# Patient Record
Sex: FEMALE | Race: WHITE | NOT HISPANIC OR LATINO
[De-identification: names, ages, dates, MRNs, and addresses within clinical notes are randomized per-mention and may not be internally consistent; named-entity substitution may affect disease eponyms.]

---

## 2023-01-24 PROBLEM — Z00.00 ENCOUNTER FOR PREVENTIVE HEALTH EXAMINATION: Status: ACTIVE | Noted: 2023-01-24

## 2023-01-31 ENCOUNTER — TRANSCRIPTION ENCOUNTER (OUTPATIENT)
Age: 33
End: 2023-01-31

## 2023-01-31 ENCOUNTER — APPOINTMENT (OUTPATIENT)
Dept: ANTEPARTUM | Facility: CLINIC | Age: 33
End: 2023-01-31
Payer: COMMERCIAL

## 2023-01-31 ENCOUNTER — ASOB RESULT (OUTPATIENT)
Age: 33
End: 2023-01-31

## 2023-01-31 PROCEDURE — 76811 OB US DETAILED SNGL FETUS: CPT

## 2023-05-05 ENCOUNTER — INPATIENT (INPATIENT)
Facility: HOSPITAL | Age: 33
LOS: 3 days | Discharge: HOME | End: 2023-05-09
Attending: OBSTETRICS & GYNECOLOGY | Admitting: OBSTETRICS & GYNECOLOGY
Payer: COMMERCIAL

## 2023-05-05 VITALS
RESPIRATION RATE: 16 BRPM | OXYGEN SATURATION: 100 % | HEART RATE: 98 BPM | DIASTOLIC BLOOD PRESSURE: 72 MMHG | SYSTOLIC BLOOD PRESSURE: 129 MMHG | TEMPERATURE: 98 F

## 2023-05-05 DIAGNOSIS — O26.899 OTHER SPECIFIED PREGNANCY RELATED CONDITIONS, UNSPECIFIED TRIMESTER: ICD-10-CM

## 2023-05-05 DIAGNOSIS — Z34.80 ENCOUNTER FOR SUPERVISION OF OTHER NORMAL PREGNANCY, UNSPECIFIED TRIMESTER: ICD-10-CM

## 2023-05-05 LAB
APPEARANCE UR: ABNORMAL
APPEARANCE UR: CLEAR — SIGNIFICANT CHANGE UP
BACTERIA # UR AUTO: ABNORMAL
BACTERIA # UR AUTO: ABNORMAL
BILIRUB UR-MCNC: NEGATIVE — SIGNIFICANT CHANGE UP
BILIRUB UR-MCNC: NEGATIVE — SIGNIFICANT CHANGE UP
BLD GP AB SCN SERPL QL: NEGATIVE — SIGNIFICANT CHANGE UP
COLOR SPEC: SIGNIFICANT CHANGE UP
COLOR SPEC: SIGNIFICANT CHANGE UP
DIFF PNL FLD: ABNORMAL
DIFF PNL FLD: ABNORMAL
EPI CELLS # UR: 2 — SIGNIFICANT CHANGE UP
EPI CELLS # UR: 3 /HPF — SIGNIFICANT CHANGE UP
GLUCOSE UR QL: NEGATIVE — SIGNIFICANT CHANGE UP
GLUCOSE UR QL: NEGATIVE — SIGNIFICANT CHANGE UP
HCT VFR BLD CALC: 31.5 % — LOW (ref 34.5–45)
HGB BLD-MCNC: 10.2 G/DL — LOW (ref 11.5–15.5)
HYALINE CASTS # UR AUTO: 2 /LPF — SIGNIFICANT CHANGE UP (ref 0–2)
HYALINE CASTS # UR AUTO: 2 /LPF — SIGNIFICANT CHANGE UP (ref 0–7)
KETONES UR-MCNC: ABNORMAL
KETONES UR-MCNC: NEGATIVE — SIGNIFICANT CHANGE UP
LEUKOCYTE ESTERASE UR-ACNC: ABNORMAL
LEUKOCYTE ESTERASE UR-ACNC: ABNORMAL
MCHC RBC-ENTMCNC: 27.2 PG — SIGNIFICANT CHANGE UP (ref 27–34)
MCHC RBC-ENTMCNC: 32.4 GM/DL — SIGNIFICANT CHANGE UP (ref 32–36)
MCV RBC AUTO: 84 FL — SIGNIFICANT CHANGE UP (ref 80–100)
NITRITE UR-MCNC: NEGATIVE — SIGNIFICANT CHANGE UP
NITRITE UR-MCNC: NEGATIVE — SIGNIFICANT CHANGE UP
NRBC # BLD: 0 /100 WBCS — SIGNIFICANT CHANGE UP (ref 0–0)
PH UR: 6.5 — SIGNIFICANT CHANGE UP (ref 5–8)
PH UR: 7 — SIGNIFICANT CHANGE UP (ref 5–8)
PLATELET # BLD AUTO: 220 K/UL — SIGNIFICANT CHANGE UP (ref 150–400)
PROT UR-MCNC: NEGATIVE — SIGNIFICANT CHANGE UP
PROT UR-MCNC: SIGNIFICANT CHANGE UP
RBC # BLD: 3.75 M/UL — LOW (ref 3.8–5.2)
RBC # FLD: 12.8 % — SIGNIFICANT CHANGE UP (ref 10.3–14.5)
RBC CASTS # UR COMP ASSIST: 1 /HPF — SIGNIFICANT CHANGE UP (ref 0–4)
RBC CASTS # UR COMP ASSIST: 6 /HPF — HIGH (ref 0–4)
RH IG SCN BLD-IMP: POSITIVE — SIGNIFICANT CHANGE UP
RH IG SCN BLD-IMP: POSITIVE — SIGNIFICANT CHANGE UP
SP GR SPEC: 1.01 — SIGNIFICANT CHANGE UP (ref 1.01–1.02)
SP GR SPEC: 1.02 — SIGNIFICANT CHANGE UP (ref 1.01–1.02)
UROBILINOGEN FLD QL: NEGATIVE — SIGNIFICANT CHANGE UP
UROBILINOGEN FLD QL: NEGATIVE — SIGNIFICANT CHANGE UP
WBC # BLD: 12.81 K/UL — HIGH (ref 3.8–10.5)
WBC # FLD AUTO: 12.81 K/UL — HIGH (ref 3.8–10.5)
WBC UR QL: 12 /HPF — HIGH (ref 0–5)
WBC UR QL: 7 /HPF — HIGH (ref 0–5)

## 2023-05-05 RX ORDER — AMPICILLIN TRIHYDRATE 250 MG
1 CAPSULE ORAL EVERY 4 HOURS
Refills: 0 | Status: DISCONTINUED | OUTPATIENT
Start: 2023-05-05 | End: 2023-05-07

## 2023-05-05 RX ORDER — AMPICILLIN TRIHYDRATE 250 MG
2 CAPSULE ORAL ONCE
Refills: 0 | Status: COMPLETED | OUTPATIENT
Start: 2023-05-05 | End: 2023-05-05

## 2023-05-05 RX ORDER — ACETAMINOPHEN 500 MG
975 TABLET ORAL ONCE
Refills: 0 | Status: DISCONTINUED | OUTPATIENT
Start: 2023-05-05 | End: 2023-05-09

## 2023-05-05 RX ORDER — ACETAMINOPHEN 500 MG
1000 TABLET ORAL ONCE
Refills: 0 | Status: COMPLETED | OUTPATIENT
Start: 2023-05-05 | End: 2023-05-05

## 2023-05-05 RX ORDER — SODIUM CHLORIDE 9 MG/ML
1000 INJECTION, SOLUTION INTRAVENOUS
Refills: 0 | Status: DISCONTINUED | OUTPATIENT
Start: 2023-05-05 | End: 2023-05-09

## 2023-05-05 RX ORDER — SODIUM CHLORIDE 9 MG/ML
500 INJECTION, SOLUTION INTRAVENOUS ONCE
Refills: 0 | Status: COMPLETED | OUTPATIENT
Start: 2023-05-05 | End: 2023-05-05

## 2023-05-05 RX ADMIN — Medication 400 MILLIGRAM(S): at 21:52

## 2023-05-05 RX ADMIN — Medication 12 MILLIGRAM(S): at 13:28

## 2023-05-05 RX ADMIN — Medication 1000 MILLIGRAM(S): at 22:53

## 2023-05-05 RX ADMIN — SODIUM CHLORIDE 1000 MILLILITER(S): 9 INJECTION, SOLUTION INTRAVENOUS at 12:47

## 2023-05-05 RX ADMIN — Medication 200 GRAM(S): at 16:43

## 2023-05-05 RX ADMIN — SODIUM CHLORIDE 125 MILLILITER(S): 9 INJECTION, SOLUTION INTRAVENOUS at 19:00

## 2023-05-05 RX ADMIN — Medication 108 GRAM(S): at 20:40

## 2023-05-05 NOTE — OB PROVIDER TRIAGE NOTE - NSHPPHYSICALEXAM_GEN_ALL_CORE
T(C): 36.6 (05-05-23 @ 11:49), Max: 36.6 (05-05-23 @ 11:44)  HR: 83 (05-05-23 @ 12:14) (83 - 100)  BP: 129/72 (05-05-23 @ 11:49) (129/72 - 129/72)  RR: 16 (05-05-23 @ 11:49) (16 - 16)  SpO2: 96% (05-05-23 @ 12:14) (96% - 100%)  GEN:NAD  CV:RRR  Pulm: CTA bl  Abd soft NT gravid  FHT:   120  , mod molina, + accels, - decels   TOCO:  q2-5m  SSE no bleeding, +yellow discharge  VE: 1.5/50/-3/soft  SONO vtx

## 2023-05-05 NOTE — OB RN PATIENT PROFILE - FALL HARM RISK - UNIVERSAL INTERVENTIONS
Bed in lowest position, wheels locked, appropriate side rails in place/Call bell, personal items and telephone in reach/Instruct patient to call for assistance before getting out of bed or chair/Non-slip footwear when patient is out of bed/[place] to call system/Physically safe environment - no spills, clutter or unnecessary equipment/Purposeful Proactive Rounding/Room/bathroom lighting operational, light cord in reach

## 2023-05-05 NOTE — OB PROVIDER H&P - ATTENDING COMMENTS
Pt seen at bedside. Reviewed exam with pt and plan. Will consult MFM in AM for scan given one spontaneous deceleration earlier in the evening. Suspect bleeding due to cervical change however need to r/o abruption as well.     Lisa Braxton, DO

## 2023-05-05 NOTE — OB RN TRIAGE NOTE - FALL HARM RISK - UNIVERSAL INTERVENTIONS
Bed in lowest position, wheels locked, appropriate side rails in place/Call bell, personal items and telephone in reach/Instruct patient to call for assistance before getting out of bed or chair/Non-slip footwear when patient is out of bed/[place] to call system/Physically safe environment - no spills, clutter or unnecessary equipment/Purposeful Proactive Rounding/Room/bathroom lighting operational, light cord in reach

## 2023-05-05 NOTE — OB PROVIDER TRIAGE NOTE - NSOBPROVIDERNOTE_OBGYN_ALL_OB_FT
AP 34yo  @ 34w3d with brown discharge, r/o PTL, reassuring fetal heart tracing  -efm/toco  -ivf  -cbc, ua, t&s  -consider BMZ and repeat VE  DW Dr Venegas  Will EFRA Lofton PAC

## 2023-05-05 NOTE — OB PROVIDER H&P - ASSESSMENT
32yo  EDC 23 @ 34w3d admitted for r/o PTL. Pt c/o brown discharge and was found to be gaudencio on toco and made cervical change from 1.5/50/-3 to 2/60/-3. FHT reassuring.    #PTL  -continuous efm/toco  -GBS swab sent  -repeat UA  -f/u urine cx  -BMZ for FLM -    #Fetal Well Being  -BMZ -   -Continuous monitoring    #Maternal well-being  -CLD  -Continue to monitor VS  -Continue PNV and home Buspirone  -SCDs for DVT ppx       DW Dr Fox Lofton PAC

## 2023-05-05 NOTE — OB PROVIDER TRIAGE NOTE - HISTORY OF PRESENT ILLNESS
34yo  EDC 23 @ 34w3d reports brown, mucusy discharge this morning on toilet paper. Denies bright red vaginal bleeding. Pt reports lower abdominal pressure and evan-rodriges. Denies LOF. +FM. Denies recent intercourse or anything in vagina.  PNC uncomplicated      OB:   GYN:remote h/o abnlpap, neg colpo; Denies fibroids/ovarian cysts/STI's  PMH: Hypercholesterolemia, Miagraines, Anxiety  PSH: none  MEDS: PNV, Buspar 10mg BID  ALL:NKDA; seasonal  Psych: +anxiety  Accepts blood.

## 2023-05-05 NOTE — OB PROVIDER H&P - NSHPPHYSICALEXAM_GEN_ALL_CORE
T(C): 36.6 (05-05-23 @ 11:49), Max: 36.6 (05-05-23 @ 11:44)  HR: 85 (05-05-23 @ 14:59) (74 - 100)  BP: 129/72 (05-05-23 @ 11:49) (129/72 - 129/72)  RR: 16 (05-05-23 @ 11:49) (16 - 16)  SpO2: 96% (05-05-23 @ 14:59) (96% - 100%)  GEN:NAD  CV:RRR  Pulm: CTA bl  Abd soft NT gravid  FHT:    120 , mod molina, + accels, - decels   TOCO:  q2-5m  SSE no bleeding, scant white discharge  VE: 1.5/50/-3-->2/60/-3/soft  SONO vtx

## 2023-05-05 NOTE — OB PROVIDER H&P - HISTORY OF PRESENT ILLNESS
34yo  EDC 23 @ 34w3d reports brown, mucusy discharge this morning on toilet paper. Denies bright red vaginal bleeding. Pt reports lower abdominal pressure and evan-rodriges. Denies LOF. +FM. Denies recent intercourse or anything in vagina.  PNC uncomplicated      OB:   GYN:remote h/o abnl pap, neg colpo; Denies fibroids/ovarian cysts/STI's  PMH: Hypercholesterolemia, Miagraines, Anxiety  PSH: none  MEDS: PNV, Buspar 10mg BID  ALL:NKDA; seasonal  Psych: +anxiety  Accepts blood.  32yo  EDC 23 @ 34w3d reports brown, mucusy discharge this morning on toilet paper. Denies bright red vaginal bleeding. Pt reports lower abdominal pressure and evan-rodriges. Denies LOF. +FM. Denies recent intercourse or anything in vagina.  PNC uncomplicated      OB:   GYN:remote h/o abnl pap, neg colpo; Denies fibroids/ovarian cysts/STI's  PMH: Hypercholesterolemia, Migraines, Anxiety  PSH: none  MEDS: PNV, Buspar 10mg BID  ALL:NKDA; seasonal  Psych: +anxiety  Accepts blood.

## 2023-05-06 LAB
APTT BLD: 23.2 SEC — LOW (ref 27.5–35.5)
BASOPHILS # BLD AUTO: 0.02 K/UL — SIGNIFICANT CHANGE UP (ref 0–0.2)
BASOPHILS NFR BLD AUTO: 0.1 % — SIGNIFICANT CHANGE UP (ref 0–2)
COVID-19 SPIKE DOMAIN AB INTERP: POSITIVE
COVID-19 SPIKE DOMAIN ANTIBODY RESULT: >250 U/ML — HIGH
CULTURE RESULTS: SIGNIFICANT CHANGE UP
EOSINOPHIL # BLD AUTO: 0 K/UL — SIGNIFICANT CHANGE UP (ref 0–0.5)
EOSINOPHIL NFR BLD AUTO: 0 % — SIGNIFICANT CHANGE UP (ref 0–6)
FIBRINOGEN PPP-MCNC: 485 MG/DL — HIGH (ref 200–445)
HCT VFR BLD CALC: 30.8 % — LOW (ref 34.5–45)
HGB BLD-MCNC: 9.9 G/DL — LOW (ref 11.5–15.5)
IMM GRANULOCYTES NFR BLD AUTO: 1.3 % — HIGH (ref 0–0.9)
INR BLD: 0.98 RATIO — SIGNIFICANT CHANGE UP (ref 0.88–1.16)
LYMPHOCYTES # BLD AUTO: 1.51 K/UL — SIGNIFICANT CHANGE UP (ref 1–3.3)
LYMPHOCYTES # BLD AUTO: 9.4 % — LOW (ref 13–44)
MCHC RBC-ENTMCNC: 27.1 PG — SIGNIFICANT CHANGE UP (ref 27–34)
MCHC RBC-ENTMCNC: 32.1 GM/DL — SIGNIFICANT CHANGE UP (ref 32–36)
MCV RBC AUTO: 84.4 FL — SIGNIFICANT CHANGE UP (ref 80–100)
MONOCYTES # BLD AUTO: 0.43 K/UL — SIGNIFICANT CHANGE UP (ref 0–0.9)
MONOCYTES NFR BLD AUTO: 2.7 % — SIGNIFICANT CHANGE UP (ref 2–14)
NEUTROPHILS # BLD AUTO: 13.95 K/UL — HIGH (ref 1.8–7.4)
NEUTROPHILS NFR BLD AUTO: 86.5 % — HIGH (ref 43–77)
NRBC # BLD: 0 /100 WBCS — SIGNIFICANT CHANGE UP (ref 0–0)
PLATELET # BLD AUTO: 263 K/UL — SIGNIFICANT CHANGE UP (ref 150–400)
PROTHROM AB SERPL-ACNC: 11.3 SEC — SIGNIFICANT CHANGE UP (ref 10.5–13.4)
RBC # BLD: 3.65 M/UL — LOW (ref 3.8–5.2)
RBC # FLD: 13.2 % — SIGNIFICANT CHANGE UP (ref 10.3–14.5)
SARS-COV-2 IGG+IGM SERPL QL IA: >250 U/ML — HIGH
SARS-COV-2 IGG+IGM SERPL QL IA: POSITIVE
SPECIMEN SOURCE: SIGNIFICANT CHANGE UP
T PALLIDUM AB TITR SER: NEGATIVE — SIGNIFICANT CHANGE UP
WBC # BLD: 16.12 K/UL — HIGH (ref 3.8–10.5)
WBC # FLD AUTO: 16.12 K/UL — HIGH (ref 3.8–10.5)

## 2023-05-06 RX ORDER — ONDANSETRON 8 MG/1
4 TABLET, FILM COATED ORAL ONCE
Refills: 0 | Status: COMPLETED | OUTPATIENT
Start: 2023-05-06 | End: 2023-05-06

## 2023-05-06 RX ORDER — DIPHENHYDRAMINE HCL 50 MG
25 CAPSULE ORAL ONCE
Refills: 0 | Status: COMPLETED | OUTPATIENT
Start: 2023-05-06 | End: 2023-05-06

## 2023-05-06 RX ORDER — ACETAMINOPHEN 500 MG
1000 TABLET ORAL ONCE
Refills: 0 | Status: COMPLETED | OUTPATIENT
Start: 2023-05-06 | End: 2023-05-06

## 2023-05-06 RX ORDER — MORPHINE SULFATE 50 MG/1
4 CAPSULE, EXTENDED RELEASE ORAL ONCE
Refills: 0 | Status: DISCONTINUED | OUTPATIENT
Start: 2023-05-06 | End: 2023-05-06

## 2023-05-06 RX ORDER — OXYTOCIN 10 UNIT/ML
VIAL (ML) INJECTION
Qty: 30 | Refills: 0 | Status: DISCONTINUED | OUTPATIENT
Start: 2023-05-06 | End: 2023-05-07

## 2023-05-06 RX ADMIN — Medication 108 GRAM(S): at 00:58

## 2023-05-06 RX ADMIN — Medication 108 GRAM(S): at 21:23

## 2023-05-06 RX ADMIN — Medication 2 MILLIUNIT(S)/MIN: at 23:48

## 2023-05-06 RX ADMIN — Medication 108 GRAM(S): at 17:06

## 2023-05-06 RX ADMIN — ONDANSETRON 4 MILLIGRAM(S): 8 TABLET, FILM COATED ORAL at 06:29

## 2023-05-06 RX ADMIN — Medication 400 MILLIGRAM(S): at 06:03

## 2023-05-06 RX ADMIN — Medication 108 GRAM(S): at 13:25

## 2023-05-06 RX ADMIN — ONDANSETRON 4 MILLIGRAM(S): 8 TABLET, FILM COATED ORAL at 18:58

## 2023-05-06 RX ADMIN — Medication 25 MILLIGRAM(S): at 10:10

## 2023-05-06 RX ADMIN — Medication 108 GRAM(S): at 05:22

## 2023-05-06 RX ADMIN — Medication 108 GRAM(S): at 09:20

## 2023-05-06 RX ADMIN — Medication 1000 MILLIGRAM(S): at 07:00

## 2023-05-06 RX ADMIN — MORPHINE SULFATE 4 MILLIGRAM(S): 50 CAPSULE, EXTENDED RELEASE ORAL at 21:02

## 2023-05-06 RX ADMIN — MORPHINE SULFATE 4 MILLIGRAM(S): 50 CAPSULE, EXTENDED RELEASE ORAL at 20:01

## 2023-05-06 RX ADMIN — MORPHINE SULFATE 4 MILLIGRAM(S): 50 CAPSULE, EXTENDED RELEASE ORAL at 20:31

## 2023-05-06 RX ADMIN — Medication 12 MILLIGRAM(S): at 13:26

## 2023-05-06 RX ADMIN — MORPHINE SULFATE 4 MILLIGRAM(S): 50 CAPSULE, EXTENDED RELEASE ORAL at 21:17

## 2023-05-06 NOTE — CONSULT NOTE PEDS - SUBJECTIVE AND OBJECTIVE BOX
Ms. Zapien is a 34 y/o  admitted for PTL versus abruption, currently at 34w4d gestational age. Maternal history notable for hypercholesterolemia, migraines, anxiety (on buspar), and prenatal history notable for none aside from previously listed. s/p betamethasone -. Most recent EFW 2100 g per chart review.    I met with Ms. Rowe and her partner and discussed what to expect should she deliver at 34 weeks gestation. We discussed the followin. The NICU team will be present at her delivery and will immediately assess and care for her infant.    2. The infant may require respiratory support, most commonly in the form of nasal CPAP. While unlikely, there is a small possibility that the infant would require intubation and mechanical ventilation.    3. Depending on the clinical status of the infant, enteral feedings may or may not be started immediately. The infant will receive IVF/IV nutrition as necessary. Due to immature suck/swallow, orogastric or nasogastric tube may be required once feeds are initiated. The infant is also at risk for hypoglycemia due to prematurity.    4. Discussed the benefits of breastfeeding in  infants and encouraged mother to pump following delivery. Discussed donor breastmilk, as well. Donor breastmilk offered and declined by Ms. Zapien.     5. The infant will be at risk for jaundice which can be treated with phototherapy.    6. The infant will be screened for infection and treated with antibiotics if deemed clinically necessary.    7. The infant is at risk for thermoregulation issues.    8. The infant is at risk for developmental delays as a consequence of prematurity. The infant will be evaluated by a developmental pediatrician to monitor for neurodevelopmental delays.    9. Length of stay is highly variable. Reviewed average length of stay and discharge criteria.    Ms. Zapien and her partner had the opportunity to ask questions and may contact the NICU at any time if further questions arise.    Thank you for the opportunity to participate in the care of this patient and please inform us of any changes in her status.    Neil Rollins, PGY-4  NICU Fellow

## 2023-05-06 NOTE — PROGRESS NOTE ADULT - SUBJECTIVE AND OBJECTIVE BOX
R3 Antepartum Note, HD#2    Patient seen and examined at bedside. Reports irregular "stomach tightening Pt reports +FM, denies LOF, VB, HA, epigastric pain, blurred vision, CP, SOB, N/V, fevers, and chills.    Vital Signs Last 24 Hours  T(C): 36.8 (05-05-23 @ 22:06), Max: 36.8 (05-05-23 @ 15:42)  HR: 78 (05-06-23 @ 03:15) (72 - 118)  BP: 116/58 (05-05-23 @ 21:54) (116/58 - 133/73)  RR: 17 (05-05-23 @ 15:42) (16 - 17)  SpO2: 96% (05-06-23 @ 03:15) (94% - 100%)    CAPILLARY BLOOD GLUCOSE          Physical Exam:  General: NAD  Abdomen: Soft, non-tender, gravid  Ext: No pain or swelling    Cat 1 tracing overnight    Labs:             10.2   12.81 )-----------( 220      ( 05-05 @ 13:17 )             31.5                   MEDICATIONS  (STANDING):  acetaminophen     Tablet .. 975 milliGRAM(s) Oral once  ampicillin  IVPB 1 Gram(s) IV Intermittent every 4 hours  betamethasone Injectable 12 milliGRAM(s) IntraMuscular every 24 hours  busPIRone 10 milliGRAM(s) Oral two times a day  lactated ringers. 1000 milliLiter(s) (125 mL/Hr) IV Continuous <Continuous>  prenatal multivitamin 1 Tablet(s) Oral daily    MEDICATIONS  (PRN):   R3 Antepartum Note, HD#2    Patient seen and examined at bedside. Reports irregular "stomach tightening Pt reports +FM, denies LOF, VB, HA, epigastric pain, blurred vision, CP, SOB, N/V, fevers, and chills.    Vital Signs Last 24 Hours  T(C): 36.8 (05-05-23 @ 22:06), Max: 36.8 (05-05-23 @ 15:42)  HR: 78 (05-06-23 @ 03:15) (72 - 118)  BP: 116/58 (05-05-23 @ 21:54) (116/58 - 133/73)  RR: 17 (05-05-23 @ 15:42) (16 - 17)  SpO2: 96% (05-06-23 @ 03:15) (94% - 100%)    CAPILLARY BLOOD GLUCOSE          Physical Exam:  General: NAD  Abdomen: Soft, non-tender, gravid  Ext: No pain or swelling  VE: 3/60/-3    Cat 1 tracing overnight    Labs:             10.2   12.81 )-----------( 220      ( 05-05 @ 13:17 )             31.5                   MEDICATIONS  (STANDING):  acetaminophen     Tablet .. 975 milliGRAM(s) Oral once  ampicillin  IVPB 1 Gram(s) IV Intermittent every 4 hours  betamethasone Injectable 12 milliGRAM(s) IntraMuscular every 24 hours  busPIRone 10 milliGRAM(s) Oral two times a day  lactated ringers. 1000 milliLiter(s) (125 mL/Hr) IV Continuous <Continuous>  prenatal multivitamin 1 Tablet(s) Oral daily    MEDICATIONS  (PRN):

## 2023-05-06 NOTE — PROGRESS NOTE ADULT - ASSESSMENT
34yo  EDC 23 @ 34w4d admitted for r/o PTL. Pt c/o brown discharge and was found to be gaudencio on toco and made cervical change from 1.5/50/-3 to 2/60/-3. FHT reassuring.    #PTL  -continuous efm/toco  -GBS swab sent  -UA w/ leuk esterase and few bacteria  -f/u urine cx  -BMZ for FLM -    #Fetal Well Being  -BMZ -   -Continuous monitoring    #Maternal well-being  -CLD  -Continue to monitor VS  -Continue PNV and home Buspirone  -SCDs for DVT ppx     Dante PGY3 32yo  EDC 23 @ 34w4d admitted for r/o PTL.     #PTL  -continuous efm/toco  -GBS swab sent  -UA w/ leuk esterase and few bacteria  -f/u urine cx  -BMZ for FLM -    #Fetal Well Being  -BMZ -   -Continuous monitoring    #Maternal well-being  -CLD  -Continue to monitor VS  -Continue PNV and home Buspirone  -SCDs for DVT ppx     Dante PGY3 34yo  EDC 23 @ 34w4d admitted for r/o PTL. Patient making cervical change. Exam this morning now 3/60/-3. Patient gaudencio irregularly overnight.     #PTL  -continuous efm/toco  -GBS swab sent  -UA w/ leuk esterase and few bacteria  -f/u urine cx  -BMZ for FLM -    #Fetal Well Being  -BMZ -   -Continuous monitoring    #Maternal well-being  -CLD  -Continue to monitor VS  -Continue PNV and home Buspirone  -SCDs for DVT ppx     Dante PGY3

## 2023-05-06 NOTE — CHART NOTE - NSCHARTNOTEFT_GEN_A_CORE
Patient discussed at [facility] Safety rounds due to FHT abnormalities. She had late decel followed by change in baseline to 100-110 bpm but with moderate variability and acels. Still with abdominal pain despite morphine and vaginal bleeding. Discussed that if patient loses variability or has further decels would recommend delivery. After discussion patient with subsequent fetal decelerations. Recommend IOL for possible abruption at this time.     Makeda Tesfaye, PGY-3  d/w Dr Bourgeois  per Safety Rounds with Dr Jayden Carpenter [facility]

## 2023-05-07 ENCOUNTER — TRANSCRIPTION ENCOUNTER (OUTPATIENT)
Age: 33
End: 2023-05-07

## 2023-05-07 LAB
GROUP B BETA STREP DNA (PCR): SIGNIFICANT CHANGE UP
SOURCE GROUP B STREP: SIGNIFICANT CHANGE UP

## 2023-05-07 PROCEDURE — 88307 TISSUE EXAM BY PATHOLOGIST: CPT | Mod: 26

## 2023-05-07 RX ORDER — IBUPROFEN 200 MG
600 TABLET ORAL EVERY 6 HOURS
Refills: 0 | Status: COMPLETED | OUTPATIENT
Start: 2023-05-07 | End: 2024-04-04

## 2023-05-07 RX ORDER — AER TRAVELER 0.5 G/1
1 SOLUTION RECTAL; TOPICAL EVERY 4 HOURS
Refills: 0 | Status: DISCONTINUED | OUTPATIENT
Start: 2023-05-07 | End: 2023-05-09

## 2023-05-07 RX ORDER — LANOLIN
1 OINTMENT (GRAM) TOPICAL EVERY 6 HOURS
Refills: 0 | Status: DISCONTINUED | OUTPATIENT
Start: 2023-05-07 | End: 2023-05-09

## 2023-05-07 RX ORDER — IBUPROFEN 200 MG
600 TABLET ORAL EVERY 6 HOURS
Refills: 0 | Status: DISCONTINUED | OUTPATIENT
Start: 2023-05-07 | End: 2023-05-09

## 2023-05-07 RX ORDER — TETANUS TOXOID, REDUCED DIPHTHERIA TOXOID AND ACELLULAR PERTUSSIS VACCINE, ADSORBED 5; 2.5; 8; 8; 2.5 [IU]/.5ML; [IU]/.5ML; UG/.5ML; UG/.5ML; UG/.5ML
0.5 SUSPENSION INTRAMUSCULAR ONCE
Refills: 0 | Status: DISCONTINUED | OUTPATIENT
Start: 2023-05-07 | End: 2023-05-09

## 2023-05-07 RX ORDER — SODIUM CHLORIDE 9 MG/ML
3 INJECTION INTRAMUSCULAR; INTRAVENOUS; SUBCUTANEOUS EVERY 8 HOURS
Refills: 0 | Status: DISCONTINUED | OUTPATIENT
Start: 2023-05-07 | End: 2023-05-09

## 2023-05-07 RX ORDER — LIDOCAINE 4 G/100G
1 CREAM TOPICAL DAILY
Refills: 0 | Status: DISCONTINUED | OUTPATIENT
Start: 2023-05-07 | End: 2023-05-09

## 2023-05-07 RX ORDER — SIMETHICONE 80 MG/1
80 TABLET, CHEWABLE ORAL EVERY 4 HOURS
Refills: 0 | Status: DISCONTINUED | OUTPATIENT
Start: 2023-05-07 | End: 2023-05-09

## 2023-05-07 RX ORDER — PRAMOXINE HYDROCHLORIDE 150 MG/15G
1 AEROSOL, FOAM RECTAL EVERY 4 HOURS
Refills: 0 | Status: DISCONTINUED | OUTPATIENT
Start: 2023-05-07 | End: 2023-05-09

## 2023-05-07 RX ORDER — OXYTOCIN 10 UNIT/ML
41.67 VIAL (ML) INJECTION
Qty: 20 | Refills: 0 | Status: DISCONTINUED | OUTPATIENT
Start: 2023-05-07 | End: 2023-05-09

## 2023-05-07 RX ORDER — KETOROLAC TROMETHAMINE 30 MG/ML
30 SYRINGE (ML) INJECTION ONCE
Refills: 0 | Status: DISCONTINUED | OUTPATIENT
Start: 2023-05-07 | End: 2023-05-07

## 2023-05-07 RX ORDER — DIPHENHYDRAMINE HCL 50 MG
25 CAPSULE ORAL EVERY 6 HOURS
Refills: 0 | Status: DISCONTINUED | OUTPATIENT
Start: 2023-05-07 | End: 2023-05-09

## 2023-05-07 RX ORDER — ONDANSETRON 8 MG/1
4 TABLET, FILM COATED ORAL ONCE
Refills: 0 | Status: COMPLETED | OUTPATIENT
Start: 2023-05-07 | End: 2023-05-07

## 2023-05-07 RX ORDER — ACETAMINOPHEN 500 MG
975 TABLET ORAL
Refills: 0 | Status: DISCONTINUED | OUTPATIENT
Start: 2023-05-07 | End: 2023-05-09

## 2023-05-07 RX ORDER — DIBUCAINE 1 %
1 OINTMENT (GRAM) RECTAL EVERY 6 HOURS
Refills: 0 | Status: DISCONTINUED | OUTPATIENT
Start: 2023-05-07 | End: 2023-05-09

## 2023-05-07 RX ORDER — BENZOCAINE 10 %
1 GEL (GRAM) MUCOUS MEMBRANE EVERY 6 HOURS
Refills: 0 | Status: DISCONTINUED | OUTPATIENT
Start: 2023-05-07 | End: 2023-05-09

## 2023-05-07 RX ORDER — HYDROCORTISONE 1 %
1 OINTMENT (GRAM) TOPICAL EVERY 6 HOURS
Refills: 0 | Status: DISCONTINUED | OUTPATIENT
Start: 2023-05-07 | End: 2023-05-09

## 2023-05-07 RX ORDER — MAGNESIUM HYDROXIDE 400 MG/1
30 TABLET, CHEWABLE ORAL
Refills: 0 | Status: DISCONTINUED | OUTPATIENT
Start: 2023-05-07 | End: 2023-05-09

## 2023-05-07 RX ADMIN — Medication 108 GRAM(S): at 09:13

## 2023-05-07 RX ADMIN — SODIUM CHLORIDE 3 MILLILITER(S): 9 INJECTION INTRAMUSCULAR; INTRAVENOUS; SUBCUTANEOUS at 22:00

## 2023-05-07 RX ADMIN — Medication 30 MILLIGRAM(S): at 10:19

## 2023-05-07 RX ADMIN — Medication 600 MILLIGRAM(S): at 22:50

## 2023-05-07 RX ADMIN — Medication 108 GRAM(S): at 01:18

## 2023-05-07 RX ADMIN — ONDANSETRON 4 MILLIGRAM(S): 8 TABLET, FILM COATED ORAL at 07:25

## 2023-05-07 RX ADMIN — LIDOCAINE 1 APPLICATION(S): 4 CREAM TOPICAL at 05:39

## 2023-05-07 RX ADMIN — Medication 108 GRAM(S): at 05:20

## 2023-05-07 RX ADMIN — Medication 10 MILLIGRAM(S): at 22:13

## 2023-05-07 RX ADMIN — Medication 600 MILLIGRAM(S): at 22:12

## 2023-05-07 RX ADMIN — Medication 30 MILLIGRAM(S): at 11:16

## 2023-05-07 RX ADMIN — Medication 125 MILLIUNIT(S)/MIN: at 10:17

## 2023-05-07 NOTE — OB RN DELIVERY SUMMARY - BABY A: APGAR 5 MIN COLOR, DELIVERY
Called [facility] and approved time on 1-3-18 at 1250. They will call patient to inform.   
[facility] calling stating pt is discharging today, she is requesting a call back to schedule a 1 week TCM w Dr Ashton.   
(1) body pink, extremities blue

## 2023-05-07 NOTE — DISCHARGE NOTE OB - CARE PLAN
Principal Discharge DX:	Placental abruption, third trimester  Assessment and plan of treatment:	Pt had a labor augmentation for 34 week probable abruption. She had a vaginal delivery of male infant. Baby went to NICU, Pt is stable for discharge on PPD   1

## 2023-05-07 NOTE — OB PROVIDER LABOR PROGRESS NOTE - ASSESSMENT
A/P: 34 4/7 weeks   labor vs abruption  MFM  wants patient induced  Will use pitocin to see if fetus can tolerate ctx  epidural  consents signed  Lary Bourgeois MD
decel recovered with repositioning  continuous monitoring  for MFM sono tomorrow  Dr Braxton aware
A/P: Labor induction for vaginal bleeding and pain at 34 5/7 weeks  -pitocin  -AROM_scant bloody fluid  Lary Bourgeois MD
A/P:  labor at 34 4/7 weeks vs abruption  second dose BMZ given this AM  IV ABx for PTL and GBS unknown  Pt to have a sono tomorrow if not delivered prior to check placenta  repeat labs now to monitor H/h  Consents signed  IV morphine offered for pain  clear liquid diet  continuous monitoring  deliver for fetal indication or if change in VE  Lary Pérez MD  
Reviewed unchanged exam. Back pain is positional and likely from laying in the same position for prolonged period. Pt was repositioned. Will continue to monitor. MFM scan in AM.     Lisa Braxton DO
cw continuous efm/toco  Benadryl 25mg IVP  DW Jadon Braxton and Bk Lofton PAC
A/P:   - Labor: Pt seen for repeat VE. Not making change, c/w  contractions.   - Fetus: cat 2, overall reassuring   - GBS: UK on Amp  - Pain: morphine    Makeda Tesfaye, PGY-3  d/w Dr Bourgeois  
A/P: Labor augmentation  pitocin  epidural  begin pushing when fully  Lary Bourgeois MD
A/P 33y  @34w5d IOL for Cat 2 FHT i/s/o PTL  -IOL: SVE unchanged, c/w pitocin, consider AROM with next exam  -Cat 2 tracing w/ late decels, continue with maternal repositioning and resuscitative efforts  -GBS pending  -s/p BMX (-)  -EFW 2100  -Analgesia epi  -Anticipate     d/w Dr. Bk Diaz, PGY-1

## 2023-05-07 NOTE — DISCHARGE NOTE OB - HOSPITAL COURSE
Patient was admitted at 34 weeks with contractions and vaginal bleeding. She was augmented for a category 2 tracing and concern for abruption at 34 weeks, worsening pain. She had a vaginal delivery of male infant to the NICU. She is stable for discharge on PPD

## 2023-05-07 NOTE — DISCHARGE NOTE OB - CARE PROVIDER_API CALL
Lary Bourgeois  OBSTETRICS AND GYNECOLOGY  [address], [address]  [place] [address]  Phone: [phone]  Fax: [phone]  Follow Up Time:

## 2023-05-07 NOTE — OB PROVIDER DELIVERY SUMMARY - NSPROVIDERDELIVERYNOTE_OBGYN_ALL_OB_FT
Spontaneous Vaginal Delivery of liveborn infant from CHARLES position. Head, shoulders, and body delivered easily. Infant was suctioned. Light mec. Cord was clamped and cut and infant was passed to mother then to peds. Placenta delivered intact with a 3 vessel cord. Fundal massage was given and uterine fundus was found to be firm. Vaginal exam revealed an intact cervix, vaginal walls and sulci. Patient had a 1st degree laceration in the perineum that was repaired with 2-0 vicryl suture. Excellent hemostasis was noted. Patient was stable and went to recovery. Count was correct x 2.    Oc Rowell PGY3

## 2023-05-07 NOTE — OB PROVIDER DELIVERY SUMMARY - NSLOWPPHRISK_OBGYN_A_OB
No previous uterine incision/Morgan Pregnancy/Less than or equal to 4 previous vaginal births/No known bleeding disorder/No history of postpartum hemorrhage/No other PPH risks indicated

## 2023-05-07 NOTE — DISCHARGE NOTE OB - PATIENT PORTAL LINK FT
You can access the FollowMyHealth Patient Portal offered by [facility] by registering at the following website: [email or web address]. By joining [employer]’s FollowMyHealth portal, you will also be able to view your health information using other applications (apps) compatible with our system.

## 2023-05-07 NOTE — DISCHARGE NOTE OB - PLAN OF CARE
Pt had a labor augmentation for 34 week probable abruption. She had a vaginal delivery of male infant. Baby went to NICU, Pt is stable for discharge on PPD

## 2023-05-07 NOTE — OB RN DELIVERY SUMMARY - NSSELHIDDEN_OBGYN_ALL_OB_FT
[NS_[email or web address]_OB_FT:[id number]],[NS_[email or web address]_ALL_OB_FT:[id number]],[NS_DeliveryRN_OBGYN_ALL_OB_FT:[id number]]

## 2023-05-07 NOTE — OB NEONATOLOGY/PEDIATRICIAN DELIVERY SUMMARY - NSPEDSNEONOTESA_OBGYN_ALL_OB_FT
Baby Curry is a 2770 gm product of a 34.5 week gestation born to a  33 year old female with EDC 23 .   Maternal labs include blood type B+ , Rub immune, RPR nonreactive, Hep B[- ], GBS unknown (treated with 11 doses of ampicillin), HIV negative. Maternal history is significant for hypercholesterolemia and anxiety on Buspar. Pregnancy was uncomplicated except for  labor. Mother presented at 34+3 with contractions and bloody discharge, found to be making cervical change. Betamethasone given 5 and 5/. Labor with category II tracing and meconium.  AROM at 06:18, approximately 4 hrs. Complications during delivery include possible abruption. Delivery was vaginal.  Infant emerged vigorous, resuscitation included: warmed, dried, stimulated, suctioned. At 7 minutes of life, increased work of breathing with retractions, grunting, nasal flaring. CPAP 5/21% initiated and pulse ox applied with adequate saturations.  Apgars were: 9/9. Admit to NICU.  Temperature prior to transfer 36.5 C.

## 2023-05-07 NOTE — OB RN DELIVERY SUMMARY - NS_SEPSISRSKCALC_OBGYN_ALL_OB_FT
EOS calculated successfully. EOS Risk Factor: 0.5/1000 live births ([facility] national incidence); GA=34w5d; Temp=98.6; ROM=3.833; GBS='Unknown'; Antibiotics='Broad spectrum antibiotics > 4 hrs prior to birth'

## 2023-05-07 NOTE — OB PROVIDER LABOR PROGRESS NOTE - NS_SUBJECTIVE/OBJECTIVE_OBGYN_ALL_OB_FT
Pt seen and case d/w Dr. Jayden Carpenter  Pt reports feeling cramping that seems to be worsening and increased VB after VE
GABRIELA -Dr. Jayden Carpenter  decided patient needs to be induced for probable abruption with recurring decels  Pt has pain and wants an epidural  VE has been 3 cm for 24 hours
R1 Labor Note    S: Patient evaluated at bedside for cervical change. Patient comfortable with epidural.    O:  T(C): 36.8 (05-07-23 @ 03:08), Max: 37.0 (05-06-23 @ 13:34)  HR: 66 (05-07-23 @ 03:11) (65 - 118)  BP: 96/51 (05-07-23 @ 03:03) (84/48 - 132/61)  RR: 16 (05-07-23 @ 03:08) (16 - 18)  SpO2: 91% (05-07-23 @ 03:11) (87% - 100%)
R3 OB Labor Note    S: Patient seen and examined at bedside.     T(C): 36.9 (05-06-23 @ 18:41), Max: 37.0 (05-06-23 @ 13:34)  HR: 80 (05-06-23 @ 19:30) (65 - 116)  BP: 96/52 (05-06-23 @ 18:41) (96/52 - 132/61)  BP(mean): --  ABP: --  ABP(mean): --  RR: 16 (05-06-23 @ 18:41) (16 - 18)  SpO2: 97% (05-06-23 @ 19:30) (94% - 100%)  Wt(kg): --  CVP(mm Hg): --  CI: --  CAPILLARY BLOOD GLUCOSE       N/A      05-05 @ 07:01  -  05-06 @ 07:00  --------------------------------------------------------  IN:    IV PiggyBack: 100 mL    Lactated Ringers: 950 mL    Lactated Ringers Bolus: 1000 mL  Total IN: 2050 mL    OUT:    Voided (mL): 400 mL  Total OUT: 400 mL    Total NET: 1650 mL      05-06 @ 07:01  -  05-06 @ 19:32  --------------------------------------------------------  IN:    IV PiggyBack: 250 mL    Lactated Ringers: 1000 mL  Total IN: 1250 mL    OUT:  Total OUT: 0 mL    Total NET: 1250 mL
Called to bedside due to L sided back pain that is better when she sits up or turns to her side. Also feeling ctx. Upon my arrival pt in no acute distress. On exam no CVAT.
Pt feels pressure
Pt feels pressure
Pt reports increase in ctx pain abdominally as well as back pain  T(C): 36.9 (05-06-23 @ 07:59), Max: 36.9 (05-06-23 @ 07:59)  HR: 85 (05-06-23 @ 09:36) (72 - 118)  BP: 111/60 (05-06-23 @ 07:52) (111/60 - 133/73)  RR: 16 (05-06-23 @ 06:08) (16 - 17)  SpO2: 97% (05-06-23 @ 09:36) (94% - 100%)
called to bedside for prolonged decel. pt lying on side  T(C): 36.8 (05-05-23 @ 15:42), Max: 36.8 (05-05-23 @ 15:42)  HR: 99 (05-05-23 @ 17:28) (74 - 110)  BP: 118/59 (05-05-23 @ 15:42) (118/59 - 129/72)  RR: 17 (05-05-23 @ 15:42) (16 - 17)  SpO2: 97% (05-05-23 @ 17:28) (95% - 100%)

## 2023-05-07 NOTE — OB PROVIDER DELIVERY SUMMARY - NSSELHIDDEN_OBGYN_ALL_OB_FT
[NS_[email or web address]_OB_FT:[id number]],[[email or web address]_ALL_OB_FT:[id number]],[NS_DeliveryRN_OBGYN_ALL_OB_FT:[id number]]

## 2023-05-07 NOTE — DISCHARGE NOTE OB - MEDICATION SUMMARY - MEDICATIONS TO TAKE
I will START or STAY ON the medications listed below when I get home from the hospital:    ibuprofen 600 mg oral tablet  -- 1 tab(s) by mouth every 6 hours  -- Indication: For Mild to moderate pain    acetaminophen 325 mg oral tablet  -- 3 tab(s) by mouth every 6 hours  -- Indication: For Mild to moderate PAIN    busPIRone 10 mg oral tablet  -- 1 tab(s) by mouth 2 times a day  -- Indication: For Anxiety    dibucaine 1% topical ointment  -- 1 Apply on skin to affected area every 6 hours As needed Perineal discomfort  -- Indication: For Perineal discomfort    Prenatal Multi + DHA oral capsule  -- 1 by mouth once a day  -- Indication: For Supplement

## 2023-05-07 NOTE — OB PROVIDER LABOR PROGRESS NOTE - NSVAGINALEXAM_OBGYN_ALL_OB_DT
06-May-2023 09:40
07-May-2023 06:19
06-May-2023 19:33
07-May-2023 08:42
05-May-2023 17:31
07-May-2023 03:14
06-May-2023 01:15
06-May-2023 12:12

## 2023-05-07 NOTE — DISCHARGE NOTE OB - MATERIALS PROVIDED
[facility] [place] Screening Program/  Immunization Record/Breastfeeding Mother’s Support Group Information/Guide to Postpartum Care/[facility] Hearing Screen Program/Back To Sleep Handout/Shaken Baby Prevention Handout/Breastfeeding Guide and Packet/Birth Certificate Instructions/Discharge Medication Information for Patients and Families Pocket Guide/MMR Vaccination (VIS Pub Date: 2012)

## 2023-05-08 RX ADMIN — Medication 975 MILLIGRAM(S): at 22:18

## 2023-05-08 RX ADMIN — Medication 975 MILLIGRAM(S): at 09:55

## 2023-05-08 RX ADMIN — Medication 975 MILLIGRAM(S): at 09:25

## 2023-05-08 RX ADMIN — Medication 975 MILLIGRAM(S): at 22:50

## 2023-05-08 RX ADMIN — Medication 10 MILLIGRAM(S): at 22:21

## 2023-05-08 NOTE — PROGRESS NOTE ADULT - SUBJECTIVE AND OBJECTIVE BOX
OB Attending Note    S: Patient doing well. Minimal lochia. Pain controlled.    O: Vital Signs Last 24 Hrs  T(C): 37.2 (08 May 2023 09:48), Max: 37.2 (08 May 2023 09:48)  T(F): 98.9 (08 May 2023 09:48), Max: 98.9 (08 May 2023 09:48)  HR: 74 (08 May 2023 09:48) (69 - 94)  BP: 107/69 (08 May 2023 09:48) (92/51 - 107/69)        Gen: NAD  Abd: soft, NT, ND, fundus firm below umbilicus  Lochia: min  Perineum healing well  Ext: no tenderness    Labs:                        9.9    16.12 )-----------( 263      ( 06 May 2023 15:27 )             30.8       A: 33y PPD# 1 s/p  doing well.    Plan: cont PP care  OOB/ambulation  Pain control

## 2023-05-08 NOTE — PROGRESS NOTE ADULT - SUBJECTIVE AND OBJECTIVE BOX
PA PPD#1  Note    Blood Type:  B+         Rubella:  Immune               RPR:  NR  Pain:  Controlled  Complaints:  None  Pt. is OOB, voiding, passing flatus, & tolerating regular diet.  Lochia:  WNL    VS:  Vital Signs Last 24 Hrs  T(C): 36.9 (08 May 2023 05:39), Max: 37.1 (07 May 2023 18:13)  T(F): 98.5 (08 May 2023 05:39), Max: 98.7 (07 May 2023 18:13)  HR: 69 (08 May 2023 05:39) (67 - 148)  BP: 102/62 (08 May 2023 05:39) (83/51 - 146/66)  RR: 18 (08 May 2023 05:39) (15 - 18)  SpO2: 98% (08 May 2023 05:39) (80% - 99%)    Parameters below as of 08 May 2023 05:39  Patient On (Oxygen Delivery Method): room air                        9.9    16.12 )-----------( 263      ( 06 May 2023 15:27 )             30.8     Abd:  Soft, non-distended, non-tender            Fundus-firm  Laceration-1st Degree: C/D/I  Extremities:  Edema - none                     Calf Tenderness - none    Assessment:    33 y.o. G 1 P 1001      PPD #1  S/P  in stable condition.  PMHx significant for:  None  Current Issues:  None  Plan:  Increase OOB             Cont. Pain Protocol             Cont. Reg. Diet             Cont. PP Adarsh.    KAT Palafox

## 2023-05-09 VITALS
TEMPERATURE: 99 F | DIASTOLIC BLOOD PRESSURE: 79 MMHG | HEART RATE: 80 BPM | OXYGEN SATURATION: 97 % | SYSTOLIC BLOOD PRESSURE: 118 MMHG | RESPIRATION RATE: 18 BRPM

## 2023-05-09 PROCEDURE — 59050 FETAL MONITOR W/REPORT: CPT

## 2023-05-09 PROCEDURE — 86769 SARS-COV-2 COVID-19 ANTIBODY: CPT

## 2023-05-09 PROCEDURE — 85025 COMPLETE CBC W/AUTO DIFF WBC: CPT

## 2023-05-09 PROCEDURE — 85027 COMPLETE CBC AUTOMATED: CPT

## 2023-05-09 PROCEDURE — 86780 TREPONEMA PALLIDUM: CPT

## 2023-05-09 PROCEDURE — 87086 URINE CULTURE/COLONY COUNT: CPT

## 2023-05-09 PROCEDURE — 85730 THROMBOPLASTIN TIME PARTIAL: CPT

## 2023-05-09 PROCEDURE — 86850 RBC ANTIBODY SCREEN: CPT

## 2023-05-09 PROCEDURE — 85610 PROTHROMBIN TIME: CPT

## 2023-05-09 PROCEDURE — 81001 URINALYSIS AUTO W/SCOPE: CPT

## 2023-05-09 PROCEDURE — 87653 STREP B DNA AMP PROBE: CPT

## 2023-05-09 PROCEDURE — 85384 FIBRINOGEN ACTIVITY: CPT

## 2023-05-09 PROCEDURE — 90707 MMR VACCINE SC: CPT

## 2023-05-09 PROCEDURE — 88307 TISSUE EXAM BY PATHOLOGIST: CPT

## 2023-05-09 PROCEDURE — 36415 COLL VENOUS BLD VENIPUNCTURE: CPT

## 2023-05-09 PROCEDURE — 86901 BLOOD TYPING SEROLOGIC RH(D): CPT

## 2023-05-09 PROCEDURE — 86900 BLOOD TYPING SEROLOGIC ABO: CPT

## 2023-05-09 RX ORDER — IBUPROFEN 200 MG
1 TABLET ORAL
Qty: 0 | Refills: 0 | DISCHARGE
Start: 2023-05-09

## 2023-05-09 RX ORDER — DIBUCAINE 1 %
1 OINTMENT (GRAM) RECTAL
Qty: 0 | Refills: 0 | DISCHARGE
Start: 2023-05-09

## 2023-05-09 RX ORDER — ACETAMINOPHEN 500 MG
3 TABLET ORAL
Qty: 0 | Refills: 0 | DISCHARGE
Start: 2023-05-09

## 2023-05-09 RX ADMIN — Medication 600 MILLIGRAM(S): at 06:33

## 2023-05-09 RX ADMIN — Medication 600 MILLIGRAM(S): at 06:55

## 2023-05-09 RX ADMIN — Medication 0.5 MILLILITER(S): at 21:22

## 2023-05-09 NOTE — PROGRESS NOTE ADULT - SUBJECTIVE AND OBJECTIVE BOX
PPD#2- ATTENDING NOTE    S: Patient doing well. Minimal lochia. Pain controlled.    O: Vital Signs Last 24 Hrs  T(C): 37.1 (09 May 2023 06:03), Max: 37.2 (08 May 2023 09:48)  T(F): 98.7 (09 May 2023 06:03), Max: 98.9 (08 May 2023 09:48)  HR: 83 (09 May 2023 06:03) (73 - 83)  BP: 104/67 (09 May 2023 06:03) (104/67 - 111/75)  BP(mean): --  RR: 18 (09 May 2023 06:03) (18 - 18)  SpO2: 98% (09 May 2023 06:03) (96% - 98%)    Parameters below as of 09 May 2023 06:03  Patient On (Oxygen Delivery Method): room air        Gen: NAD  Abd: soft, NT, ND, fundus firm below umbilicus  Lochia: moderate  Ext: no tenderness, no hyper reflexia  Voiding well    Labs:     Antibody Screen    A: 33y PPD# s/p  doing well.    Plan:  Analgesia prn  Regular diet  Discharge instruction given  F/U 6 weeks      Office [phone]  Dr. Langston

## 2023-05-19 LAB — SURGICAL PATHOLOGY STUDY: SIGNIFICANT CHANGE UP

## 2023-07-19 NOTE — PRE-ANESTHESIA EVALUATION ADULT - BP NONINVASIVE DIASTOLIC (MM HG)
Informant: parent    Diet History:  Appetite? good   Meats? many   Fruits? moderate amount   Vegetables? few   Junk Food?moderate amount   Intolerances? no    Sleep History:  Sleep Pattern: has difficulty falling asleep     Problems? no    Educational History:  School: [employer] thGthrthathdtheth:th th9th Type of Student: good  Extracurricular Activities: volleyball    Behavioral Assessment:   Is your child restless or overactive? Never   Excitable, impulsive? Never   Fails to finish things he/she starts? Sometimes   Inattentive, easily distracted? Sometimes   Temper outbursts? Sometimes   Fidgeting? Never   Disturbs other children? Never   Demands must be met immediately-easily frustrated? Never   Cries often and easily? Never   Mood changes quickly and drastically? Never    Medications: All medications have been reviewed. Currently is not taking over-the-counter medication(s).   Medication(s) currently being used have been reviewed and added to the medication list.
Rowdy Adrian is a 14 y.o. female who presentstoday for   Chief Complaint   Patient presents with    Well Child     Historian: patient and parent    HPI:  14 y/o WF here for Well Child Visit. She is going into 8th grade next month and will try out for volleyball in January 2024. She is still having issues with acne and the Differin gel seemed to make her skin worse. She has tried several different face washes etc for acne (ex Cera Ve) without improvement. Patient has had trouble going to sleep at night and it may take 30-60 minutes to fall asleep, even with 10 mg of melatonin. Mother feels like patient is looking at her phone too much before bedtime also. Patient did first depression screen today but she says she does not feel depressed but has had the trouble falling asleep and mild issues with focus/attention in school. She makes As and Bs also. PHQ Scores 7/19/2023   PHQ2 Score 2   PHQ9 Score 9     Interpretation of Total Score Depression Severity: 1-4 = Minimal depression, 5-9 = Mild depression, 10-14 = Moderate depression, 15-19 = Moderately severe depression, 20-27 = Severe depression      Diet History:  Appetite? good              Meats? many              Fruits? moderate amount              Vegetables? few              Junk Food?moderate amount              Intolerances? no     Sleep History:  Sleep Pattern: has difficulty falling asleep                              Problems? no     Educational History:  School: [employer]       thGthrthathdtheth:th th9th Type of Student: good  Extracurricular Activities: volleyball     Behavioral Assessment:              Is your child restless or overactive? Never              Excitable, impulsive? Never              Fails to finish things he/she starts? Sometimes              Inattentive, easily distracted? Sometimes              Temper outbursts? Sometimes              Fidgeting? Never              Disturbs other children?  Never              Demands must be met
68

## 2024-07-27 NOTE — PRE-ANESTHESIA EVALUATION ADULT - HEIGHT IN CM
Problem: Discharge Planning  Goal: Discharge to home or other facility with appropriate resources  7/27/2024 0937 by Tahmina Gonzalez RN  Outcome: Progressing  7/26/2024 2246 by Alicia Howard RN  Outcome: Progressing     Problem: Safety - Adult  Goal: Free from fall injury  7/27/2024 0937 by Tahmina Gonzalez RN  Outcome: Progressing  7/26/2024 2246 by Alicia Howard RN  Outcome: Progressing     Problem: Chronic Conditions and Co-morbidities  Goal: Patient's chronic conditions and co-morbidity symptoms are monitored and maintained or improved  7/27/2024 0937 by Tahmina Gonzalez RN  Outcome: Progressing  7/26/2024 2246 by Alicia Howard RN  Outcome: Progressing     Problem: Nutrition Deficit:  Goal: Optimize nutritional status  7/27/2024 0937 by Tahmina Gonzalez RN  Outcome: Progressing  7/26/2024 2246 by Alicia Howard RN  Outcome: Progressing     Problem: Pain  Goal: Verbalizes/displays adequate comfort level or baseline comfort level  7/27/2024 0937 by Tahmina Gonzalez RN  Outcome: Progressing  7/26/2024 2246 by Alicia Howard RN  Outcome: Progressing     Problem: Skin/Tissue Integrity  Goal: Absence of new skin breakdown  Description: 1.  Monitor for areas of redness and/or skin breakdown  2.  Assess vascular access sites hourly  3.  Every 4-6 hours minimum:  Change oxygen saturation probe site  4.  Every 4-6 hours:  If on nasal continuous positive airway pressure, respiratory therapy assess nares and determine need for appliance change or resting period.  7/27/2024 0937 by Tahmina Gonzalez RN  Outcome: Progressing  7/26/2024 2246 by Alicia Howard RN  Outcome: Progressing     Problem: ABCDS Injury Assessment  Goal: Absence of physical injury  7/27/2024 0937 by Tahmina Gonzalez RN  Outcome: Progressing  7/26/2024 2246 by Alicia Howard RN  Outcome: Progressing      157.48

## 2024-08-25 NOTE — OB PROVIDER LABOR PROGRESS NOTE - NS_OBIHIFHRDETAILS_OBGYN_ALL_OB_FT
RN NOTES



LEFT ARM ULTRASOUND PERFORMED TO IDENTIFY ADEQUATE VEIN FOR MIDLINE INSERTION. LEFT ARM 
PREPPED USING STERILE TECHNIQUE. POWERGLIDE 18G/10CM WAS INSERTED AT LEFT CEPHALIC VEIN WITH 
EASE, GOOD NON PULSATILE BLOOD RETURN AND SECURED WITH INTERLOCK DEVICE. DRESSED USING 
NORMAL STERILE FASHION. EACH PORT WAS ASPIRATED WITH VENOUS BLOOD AND EACH PORT WAS FLUSHED 
WITH 10ML OF NORMAL SALINE. PROCEDURE WELL TOLERATED BY THE PT. SUPERVISED BY DR.EM MULLER 
PhD. ENDORSED TO BEDSIDE CHANDA GARCIA
cat 1
Pt currently off the monitor and getting an epidural
120/mod/+accels/+late decels, improved with maternal repositioning
 moderate variability, accels present, 3 minute decel at 10:33 am and an occasional variable
cat 1
120/mod/+accels/prolonged decel x 3min to 75bpm/recovered to baseline
 moderate variability, accels present, occasional variables
110/mod/+acels
FHR reactive, moderate variability, accels present

## 2024-12-13 NOTE — OB PROVIDER H&P - NSCORESITESY/N_GEN_A_CORE_RD
12/13/2024          Jeanna Coats  [address] [facility] [address]        Dear AnushkaNATALIA Coats    The results from your recent test(s) are within normal limits and enclosed.      Resulted Orders   Comprehensive Metabolic Panel   Result Value Ref Range    Fasting Status 18 0 - 999 Hours    Sodium 140 135 - 145 mmol/L    Potassium 4.2 3.4 - 5.1 mmol/L    Chloride 100 97 - 110 mmol/L    Carbon Dioxide 31 21 - 32 mmol/L    Anion Gap 13 7 - 19 mmol/L    Glucose 103 (H) 70 - 99 mg/dL    BUN 36 (H) 6 - 20 mg/dL    Creatinine 1.45 (H) 0.51 - 0.95 mg/dL    Glomerular Filtration Rate 35 (L) >=60    BUN/Cr 25 7 - 25    Calcium 9.8 8.4 - 10.2 mg/dL    Bilirubin, Total 0.5 0.2 - 1.0 mg/dL    GOT/AST 24 <=37 Units/L    GPT/ALT 28 <64 Units/L    Alkaline Phosphatase 80 45 - 117 Units/L    Albumin 4.1 3.4 - 5.0 g/dL    Protein, Total 7.2 6.4 - 8.2 g/dL    Globulin 3.1 2.0 - 4.0 g/dL    A/G Ratio 1.3 1.0 - 2.4   Parathyroid Hormone Intact Without Calcium   Result Value Ref Range    PTH, Intact 61 19 - 88 pg/mL   Protein/Creatinine Ratio, Urine   Result Value Ref Range    Protein, Urine 13 (H) <12 mg/dL    Creatinine, Urine 25.3 mg/dL    Protein/ Creatinine Ratio 514 (H) <=199 mgPR/gCR   Magnesium   Result Value Ref Range    Magnesium 2.0 1.7 - 2.4 mg/dL   Phosphorus   Result Value Ref Range    Phosphorus 4.4 2.4 - 4.7 mg/dL        Recommendations:  ----- Message from Mp Briceño MD sent at 12/13/2024  6:58 AM CST -----  All  labs are stable.  Continue the same medications and plan.  Keep your future appointment.                Sincerely,      Mp Briceño MD  [address] [address] [phone]           No